# Patient Record
(demographics unavailable — no encounter records)

---

## 2024-11-13 NOTE — ASSESSMENT
[FreeTextEntry1] : 1. Favor melanotic macule on the left lower lip   - Photo taken for clinical monitoring  - Referral placed to ENT/facial plastics (Dr. Gonzalez) as pt is requesting cosmetic removal   - If pt does not opt for excision, recommended short term clinical f/u of 2-3 months  2. Acne vulgaris, mild  Chronic, flaring  S/p course of isotretinoin completed 2/2024, cumulative dose 89701(goal dose - 150 -220 mg/kg = 9150 - 79225 mg), 190mg/kg - Start Tretinoin 0.05% crm at bedtime. Apply pea size amt spread thinly over entire face, 30 min after washing face, every other night for 2 wks, then advance to qhs as tolerated. SED.   RTC 2-3 months for monitoring or PRN

## 2024-11-13 NOTE — HISTORY OF PRESENT ILLNESS
[FreeTextEntry1] : growth on lip, acne  [de-identified] : ANNMARIE POSADAS is a 28 year old female who presents for eval of:   1. Dark spot on the right lower lip, noticed by patient 3 months ago, asx. Growing in size  2. Acne, s/p course of isotretinoin earlier this year. Has a few breakouts, not using any topicals   No personal or family hx skin CA

## 2024-11-13 NOTE — ASSESSMENT
[FreeTextEntry1] : 1. Favor melanotic macule on the left lower lip   - Photo taken for clinical monitoring  - Referral placed to ENT/facial plastics (Dr. Gonzalez) as pt is requesting cosmetic removal   - If pt does not opt for excision, recommended short term clinical f/u of 2-3 months  2. Acne vulgaris, mild  Chronic, flaring  S/p course of isotretinoin completed 2/2024, cumulative dose 27922(goal dose - 150 -220 mg/kg = 9150 - 77639 mg), 190mg/kg - Start Tretinoin 0.05% crm at bedtime. Apply pea size amt spread thinly over entire face, 30 min after washing face, every other night for 2 wks, then advance to qhs as tolerated. SED.   RTC 2-3 months for monitoring or PRN

## 2024-11-13 NOTE — HISTORY OF PRESENT ILLNESS
[FreeTextEntry1] : growth on lip, acne  [de-identified] : ANNMARIE POSADAS is a 28 year old female who presents for eval of:   1. Dark spot on the right lower lip, noticed by patient 3 months ago, asx. Growing in size  2. Acne, s/p course of isotretinoin earlier this year. Has a few breakouts, not using any topicals   No personal or family hx skin CA

## 2024-12-03 NOTE — HISTORY OF PRESENT ILLNESS
[FreeTextEntry1] : Chief Complaint: - Patient presents today for well woman exam and has been dealing with recurrent right-sided pelvic pain for 5 years  LMP: - Not available as patient reports not having periods due to IUD.  POB: Denies PGYN: - Patient has Mirena IUD inserted approximately two years ago. Pain experienced prior to insertion but has been more pronounced after IUD placement. PMH: - GiI workup for pelvic pain negative PSH: - Patient denies any past surgical history. Med: - Patient is currently on Mirena IUD. All: - NKMA SH: - Patient denies any history of smoking, drinking, or drug use. FH: - Patient denies any family history of cancer.   Complaints/Concerns: - Patient complains of recurrent sharp right-sided pain, possible association with the presence of IUD.  Assessment & Plan: - The patient is a 28-year-old female who presented with a complaint of recurrent right-sided pain, thought to be associated with her current Mirena IUD. Ultrasound will be ordered to evaluate the positioning of IUD. I discussed with the patient the possibility that the current IUD might be too large for her uterine cavity. We will plan for IUD removal and replacement with Kyleena vs Delia. Prescription for pain management may be necessary for IUD removal process. The patient is also considering taking a break from birth control. Reassessment to be done once ultrasound results are in and after the removal and replacement of the IUD.

## 2024-12-03 NOTE — PHYSICAL EXAM
[Chaperone Present] : A chaperone was present in the examining room during all aspects of the physical examination [24165] : A chaperone was present during the pelvic exam. [Appropriately responsive] : appropriately responsive [Alert] : alert [No Acute Distress] : no acute distress [No Lymphadenopathy] : no lymphadenopathy [Soft] : soft [Non-tender] : non-tender [Non-distended] : non-distended [No HSM] : No HSM [No Lesions] : no lesions [No Mass] : no mass [Oriented x3] : oriented x3 [Examination Of The Breasts] : a normal appearance [No Masses] : no breast masses were palpable [Labia Majora] : normal [Labia Minora] : normal [IUD String] : an IUD string was noted [Normal] : normal [Uterine Adnexae] : normal

## 2025-01-03 NOTE — ASSESSMENT
[FreeTextEntry1] : 28 year old female with lower lip lesion s/p excisional biopsy today. Return in 1 week.

## 2025-01-03 NOTE — CONSULT LETTER
[Dear  ___] : Dear  [unfilled], [Consult Letter:] : I had the pleasure of evaluating your patient, [unfilled]. [Please see my note below.] : Please see my note below. [Consult Closing:] : Thank you very much for allowing me to participate in the care of this patient.  If you have any questions, please do not hesitate to contact me. [Sincerely,] : Sincerely, [FreeTextEntry2] : Dr. Caitie Auguste [FreeTextEntry3] : Caitie Gonzalez MD Facial Plastic & Reconstructive Surgery Department of Otolaryngology [address] [place] [address] [phone]

## 2025-01-03 NOTE — HISTORY OF PRESENT ILLNESS
[de-identified] : 28 year old female referred by Derm - Dr. Caitie Auguste for evaluation of lip lesion. Patient states flat, dark pigmented lower lip lesion was first noted in August of 2024. Since, continues to increase in size. No previous facial lesion of concern. Denies associated pain, swelling and bleeding.

## 2025-01-03 NOTE — PROCEDURE
[FreeTextEntry1] : lower lip lesion excisional biopsy with intermediate closure 1 cm  [FreeTextEntry2] : lower lip lesion [FreeTextEntry3] : Patient name: ANNMARIE POSADAS   MRN: [id number]   Date: [place]  3 2025  8:30AM   Surgeon: Caitie Gonzlaez MD  Assistants: None  Procedure: Excision of lower lip lesion and intermediate closure 1 cm  Pre-operative diagnosis: lower lip lesion Post-operative diagnosis: same  Indications: This is a 28 year female with a lower lip lesion that has been growing in size. All RBA were discussed with the patient, including risk of scar, infection wound dehiscence.  Procedure: After written informed consent and a surgical timeout, the area was injected with 1% lidocaine with epinephrine and 0.25% marcaine with epinephrine 1:1 mixture. After anesthesia, the area was prepped with iodine and draped in a sterile fashion. A elliptical incision was made with a 15 blade around the lesion. The lesion was released from the deep tissue layer. The lesion was sent in formalin to pathology. The wound was irrigated. Hemostasis was achieved. The mucosal layer was closed with a 5-0 fast absorbing suture. The wound was cleaned and bacitracin was applied. A pressure dressing was then applied. Patient tolerated well.  EBL: Minimal  Specimens: lower lip lesion  Disposition: Discharge to home. Post-op instructions given.

## 2025-01-17 NOTE — REASON FOR VISIT
[de-identified] : 01/03/25 [de-identified] : lower lip lesion excisional biopsy with intermediate closure 1 cm.   [de-identified] : Reports doing well, using Aquaphor to site daily  Denies bleeding, pain, dysphagia or fevers

## 2025-01-17 NOTE — REASON FOR VISIT
[de-identified] : lower lip lesion excisional biopsy with intermediate closure 1 cm.   [de-identified] : 01/03/25 [de-identified] : Reports doing well, using Aquaphor to site daily  Denies bleeding, pain, dysphagia or fevers

## 2025-01-17 NOTE — ASSESSMENT
[FreeTextEntry1] : 28 year old female with lower lip lesion sp excision. path reviewed. return as needed.

## 2025-01-23 NOTE — PHYSICAL EXAM
[Chaperone Present] : A chaperone was present in the examining room during all aspects of the physical examination [92473] : A chaperone was present during the pelvic exam. [Appropriately responsive] : appropriately responsive

## 2025-01-23 NOTE — PROCEDURE
[IUD Placement] : intrauterine device (IUD) placement [Infection] : infection [Bleeding] : bleeding [Pain] : pain [Expulsion] : expulsion [Failure] : failure [Uterine Perforation] : uterine perforation [Neg Pregnancy Test] : negative pregnancy test [Betadine] : Betadine [Tenaculum] : Tenaculum [Kyleena IUD] : Kyleena IUD [IUD Removal] : intrauterine device (IUD) removal [Time out performed] : Pre-procedure time out performed.  Patient's name, date of birth and procedure confirmed. [Consent Obtained] : Consent obtained [Risks] : risks [Benefits] : benefits [Alternatives] : alternatives [Patient] : patient [Strings Visualized] : strings visualized [IUD Discarded] : IUD discarded [Sent to Pathology] : specimen was placed in buffered formalin and sent for pathology [Tolerated Well] : Patient tolerated the procedure well [No Complications] : no complications [Heavy Vaginal Bleeding] : for heavy vaginal bleeding [Pelvic Pain] : for pelvic pain [de-identified] : post procedure ultrasound for confirmation